# Patient Record
(demographics unavailable — no encounter records)

---

## 2024-10-29 NOTE — HISTORY OF PRESENT ILLNESS
[Dull/Aching] : dull/aching [Localized] : localized [Tightness] : tightness [Gradual] : gradual [6] : 6 [5] : 5 [Intermittent] : intermittent [Household chores] : household chores [Rest] : rest [Injection therapy] : injection therapy [Stairs] : stairs [de-identified] :  10/29/2024: Known bilateral knee osteoarthritis.  He has done well in the past with episodic cortisone injections and HA injection series.  He last completed Orthovisc in May.  He presents back today stating that the right knee is more symptomatic  11/10/23: Mr Stanley Nielson is a 67 y/o RHD M here c/o many years of non-traumatic b/l knee pain (R>L) associated with overuse when walking. He has an Orhtovisc injection series in 2021 which he states helped. He has not had any recent treatment   12/1/23: here for Orthovisc #1 for bilateral knees.   12/08/23 bl knees orthovisc # 2   12/18/23 bl knees orthovisc # 3   12/29/23: here for b/l knee orthovisc #4.  [] : no [FreeTextEntry1] : knees [FreeTextEntry5] : PT states having Rt knee pain for years. Has been treated at this office before. [de-identified] : orthovisc

## 2024-10-29 NOTE — PROCEDURE
[Large Joint Injection] : Large joint injection [Knee] : knee [Pain] : pain [Inflammation] : inflammation [X-ray evidence of Osteoarthritis on this or prior visit] : x-ray evidence of Osteoarthritis on this or prior visit [Alcohol] : alcohol [Betadine] : betadine [Ethyl Chloride sprayed topically] : ethyl chloride sprayed topically [Sterile technique used] : sterile technique used [Right] : of the right [___ cc    3mg] :  Betamethasone (Celestone) ~Vcc of 3mg [___ cc    1%] : Lidocaine ~Vcc of 1%

## 2024-10-29 NOTE — IMAGING
[Bilateral] : knee bilaterally [de-identified] : Left Knee No effusion, no warmth, no ecchymosis, no erythema. Medial tenderness to palpation, no palpable defects. Range of motion 0-140   Right Knee No effusion, no warmth, no ecchymosis, no erythema. Medial tenderness to palpation, no palpable defects. Range of motion 0-130 with discomfort  [FreeTextEntry9] : Moderate plus medial and patellofemoral narrowing